# Patient Record
Sex: FEMALE | Race: WHITE | NOT HISPANIC OR LATINO | Employment: FULL TIME | URBAN - METROPOLITAN AREA
[De-identification: names, ages, dates, MRNs, and addresses within clinical notes are randomized per-mention and may not be internally consistent; named-entity substitution may affect disease eponyms.]

---

## 2020-03-09 ENCOUNTER — RECORDS - HEALTHEAST (OUTPATIENT)
Dept: LAB | Facility: CLINIC | Age: 50
End: 2020-03-09

## 2020-03-09 LAB
CHOLEST SERPL-MCNC: 227 MG/DL
FASTING STATUS PATIENT QL REPORTED: ABNORMAL
HDLC SERPL-MCNC: 67 MG/DL
LDLC SERPL CALC-MCNC: 140 MG/DL
TRIGL SERPL-MCNC: 100 MG/DL

## 2020-03-10 LAB
HPV SOURCE: NORMAL
HUMAN PAPILLOMA VIRUS 16 DNA: NEGATIVE
HUMAN PAPILLOMA VIRUS 18 DNA: NEGATIVE
HUMAN PAPILLOMA VIRUS FINAL DIAGNOSIS: NORMAL
HUMAN PAPILLOMA VIRUS OTHER HR: NEGATIVE
SPECIMEN DESCRIPTION: NORMAL

## 2020-03-18 ENCOUNTER — RECORDS - HEALTHEAST (OUTPATIENT)
Dept: ADMINISTRATIVE | Facility: OTHER | Age: 50
End: 2020-03-18

## 2021-08-31 ENCOUNTER — LAB REQUISITION (OUTPATIENT)
Dept: LAB | Facility: CLINIC | Age: 51
End: 2021-08-31

## 2021-08-31 DIAGNOSIS — R19.7 DIARRHEA, UNSPECIFIED: ICD-10-CM

## 2021-08-31 LAB — C DIFF TOX B STL QL: NEGATIVE

## 2021-08-31 PROCEDURE — 87506 IADNA-DNA/RNA PROBE TQ 6-11: CPT | Performed by: FAMILY MEDICINE

## 2021-08-31 PROCEDURE — 87493 C DIFF AMPLIFIED PROBE: CPT | Performed by: FAMILY MEDICINE

## 2021-08-31 PROCEDURE — 87177 OVA AND PARASITES SMEARS: CPT | Performed by: FAMILY MEDICINE

## 2021-09-02 LAB — O+P STL MICRO: NEGATIVE

## 2023-10-30 ENCOUNTER — LAB REQUISITION (OUTPATIENT)
Dept: LAB | Facility: CLINIC | Age: 53
End: 2023-10-30
Payer: COMMERCIAL

## 2023-10-30 DIAGNOSIS — Z13.220 ENCOUNTER FOR SCREENING FOR LIPOID DISORDERS: ICD-10-CM

## 2023-10-30 PROCEDURE — 80061 LIPID PANEL: CPT | Mod: ORL | Performed by: FAMILY MEDICINE

## 2023-10-31 LAB
CHOLEST SERPL-MCNC: 230 MG/DL
HDLC SERPL-MCNC: 80 MG/DL
LDLC SERPL CALC-MCNC: 132 MG/DL
NONHDLC SERPL-MCNC: 150 MG/DL
TRIGL SERPL-MCNC: 92 MG/DL

## 2025-01-15 ENCOUNTER — LAB REQUISITION (OUTPATIENT)
Dept: LAB | Facility: CLINIC | Age: 55
End: 2025-01-15
Payer: COMMERCIAL

## 2025-01-15 DIAGNOSIS — Z00.00 ENCOUNTER FOR GENERAL ADULT MEDICAL EXAMINATION WITHOUT ABNORMAL FINDINGS: ICD-10-CM

## 2025-01-15 LAB
CHOLEST SERPL-MCNC: 282 MG/DL
FASTING STATUS PATIENT QL REPORTED: YES
HDLC SERPL-MCNC: 64 MG/DL
LDLC SERPL CALC-MCNC: 196 MG/DL
NONHDLC SERPL-MCNC: 218 MG/DL
TRIGL SERPL-MCNC: 111 MG/DL

## 2025-01-15 PROCEDURE — 80061 LIPID PANEL: CPT | Mod: ORL | Performed by: FAMILY MEDICINE

## 2025-01-15 PROCEDURE — 87624 HPV HI-RISK TYP POOLED RSLT: CPT | Mod: ORL | Performed by: FAMILY MEDICINE

## 2025-01-15 PROCEDURE — G0145 SCR C/V CYTO,THINLAYER,RESCR: HCPCS | Mod: ORL | Performed by: FAMILY MEDICINE

## 2025-01-16 LAB
HPV HR 12 DNA CVX QL NAA+PROBE: NEGATIVE
HPV16 DNA CVX QL NAA+PROBE: NEGATIVE
HPV18 DNA CVX QL NAA+PROBE: NEGATIVE
HUMAN PAPILLOMA VIRUS FINAL DIAGNOSIS: NORMAL

## 2025-01-20 LAB
BKR AP ASSOCIATED HPV REPORT: NORMAL
BKR LAB AP GYN ADEQUACY: NORMAL
BKR LAB AP GYN INTERPRETATION: NORMAL
BKR LAB AP LMP: NORMAL
BKR LAB AP PREVIOUS ABNL DX: NORMAL
BKR LAB AP PREVIOUS ABNORMAL: NORMAL
PATH REPORT.COMMENTS IMP SPEC: NORMAL
PATH REPORT.COMMENTS IMP SPEC: NORMAL
PATH REPORT.RELEVANT HX SPEC: NORMAL

## 2025-03-29 ENCOUNTER — APPOINTMENT (OUTPATIENT)
Dept: GENERAL RADIOLOGY | Facility: CLINIC | Age: 55
End: 2025-03-29
Attending: EMERGENCY MEDICINE
Payer: COMMERCIAL

## 2025-03-29 ENCOUNTER — HOSPITAL ENCOUNTER (EMERGENCY)
Facility: CLINIC | Age: 55
Discharge: HOME | End: 2025-03-29
Attending: EMERGENCY MEDICINE | Admitting: EMERGENCY MEDICINE
Payer: COMMERCIAL

## 2025-03-29 VITALS
HEART RATE: 62 BPM | WEIGHT: 163.36 LBS | HEIGHT: 61 IN | SYSTOLIC BLOOD PRESSURE: 101 MMHG | RESPIRATION RATE: 18 BRPM | OXYGEN SATURATION: 97 % | BODY MASS INDEX: 30.84 KG/M2 | DIASTOLIC BLOOD PRESSURE: 71 MMHG | TEMPERATURE: 97.9 F

## 2025-03-29 DIAGNOSIS — R07.9 CHEST PAIN, UNSPECIFIED TYPE: ICD-10-CM

## 2025-03-29 LAB
ANION GAP SERPL CALCULATED.3IONS-SCNC: 12 MMOL/L (ref 7–15)
BASOPHILS # BLD AUTO: 0.1 10E3/UL (ref 0–0.2)
BASOPHILS NFR BLD AUTO: 1 %
BUN SERPL-MCNC: 13.9 MG/DL (ref 6–20)
CALCIUM SERPL-MCNC: 9 MG/DL (ref 8.8–10.4)
CHLORIDE SERPL-SCNC: 104 MMOL/L (ref 98–107)
CREAT SERPL-MCNC: 0.99 MG/DL (ref 0.51–0.95)
D DIMER PPP FEU-MCNC: <0.27 UG/ML FEU (ref 0–0.5)
EGFRCR SERPLBLD CKD-EPI 2021: 68 ML/MIN/1.73M2
EOSINOPHIL # BLD AUTO: 0.4 10E3/UL (ref 0–0.7)
EOSINOPHIL NFR BLD AUTO: 4 %
ERYTHROCYTE [DISTWIDTH] IN BLOOD BY AUTOMATED COUNT: 13.1 % (ref 10–15)
FLUAV RNA SPEC QL NAA+PROBE: NEGATIVE
FLUBV RNA RESP QL NAA+PROBE: NEGATIVE
GLUCOSE SERPL-MCNC: 100 MG/DL (ref 70–99)
HCO3 SERPL-SCNC: 25 MMOL/L (ref 22–29)
HCT VFR BLD AUTO: 37.8 % (ref 35–47)
HGB BLD-MCNC: 12.7 G/DL (ref 11.7–15.7)
HOLD SPECIMEN: NORMAL
HOLD SPECIMEN: NORMAL
IMM GRANULOCYTES # BLD: 0 10E3/UL
IMM GRANULOCYTES NFR BLD: 0 %
LYMPHOCYTES # BLD AUTO: 3.7 10E3/UL (ref 0.8–5.3)
LYMPHOCYTES NFR BLD AUTO: 44 %
MCH RBC QN AUTO: 29.7 PG (ref 26.5–33)
MCHC RBC AUTO-ENTMCNC: 33.6 G/DL (ref 31.5–36.5)
MCV RBC AUTO: 89 FL (ref 78–100)
MONOCYTES # BLD AUTO: 0.6 10E3/UL (ref 0–1.3)
MONOCYTES NFR BLD AUTO: 7 %
NEUTROPHILS # BLD AUTO: 3.7 10E3/UL (ref 1.6–8.3)
NEUTROPHILS NFR BLD AUTO: 44 %
NRBC # BLD AUTO: 0 10E3/UL
NRBC BLD AUTO-RTO: 0 /100
PLATELET # BLD AUTO: 232 10E3/UL (ref 150–450)
POTASSIUM SERPL-SCNC: 3.7 MMOL/L (ref 3.4–5.3)
RBC # BLD AUTO: 4.27 10E6/UL (ref 3.8–5.2)
RSV RNA SPEC NAA+PROBE: NEGATIVE
SARS-COV-2 RNA RESP QL NAA+PROBE: NEGATIVE
SODIUM SERPL-SCNC: 141 MMOL/L (ref 135–145)
TROPONIN T SERPL HS-MCNC: <6 NG/L
TROPONIN T SERPL HS-MCNC: <6 NG/L
WBC # BLD AUTO: 8.4 10E3/UL (ref 4–11)

## 2025-03-29 PROCEDURE — 99285 EMERGENCY DEPT VISIT HI MDM: CPT | Mod: 25

## 2025-03-29 PROCEDURE — 93005 ELECTROCARDIOGRAM TRACING: CPT

## 2025-03-29 PROCEDURE — 85004 AUTOMATED DIFF WBC COUNT: CPT | Performed by: EMERGENCY MEDICINE

## 2025-03-29 PROCEDURE — 36415 COLL VENOUS BLD VENIPUNCTURE: CPT | Performed by: EMERGENCY MEDICINE

## 2025-03-29 PROCEDURE — 85014 HEMATOCRIT: CPT | Performed by: EMERGENCY MEDICINE

## 2025-03-29 PROCEDURE — 85379 FIBRIN DEGRADATION QUANT: CPT | Performed by: EMERGENCY MEDICINE

## 2025-03-29 PROCEDURE — 71046 X-RAY EXAM CHEST 2 VIEWS: CPT

## 2025-03-29 PROCEDURE — 84484 ASSAY OF TROPONIN QUANT: CPT | Performed by: EMERGENCY MEDICINE

## 2025-03-29 PROCEDURE — 87637 SARSCOV2&INF A&B&RSV AMP PRB: CPT | Performed by: EMERGENCY MEDICINE

## 2025-03-29 PROCEDURE — 80048 BASIC METABOLIC PNL TOTAL CA: CPT | Performed by: EMERGENCY MEDICINE

## 2025-03-29 ASSESSMENT — COLUMBIA-SUICIDE SEVERITY RATING SCALE - C-SSRS
2. HAVE YOU ACTUALLY HAD ANY THOUGHTS OF KILLING YOURSELF IN THE PAST MONTH?: NO
6. HAVE YOU EVER DONE ANYTHING, STARTED TO DO ANYTHING, OR PREPARED TO DO ANYTHING TO END YOUR LIFE?: NO
1. IN THE PAST MONTH, HAVE YOU WISHED YOU WERE DEAD OR WISHED YOU COULD GO TO SLEEP AND NOT WAKE UP?: NO

## 2025-03-29 ASSESSMENT — ACTIVITIES OF DAILY LIVING (ADL)
ADLS_ACUITY_SCORE: 41
ADLS_ACUITY_SCORE: 41

## 2025-03-29 NOTE — ED PROVIDER NOTES
"  Emergency Department Note      History of Present Illness     Chief Complaint   Chest Pain      HPI   Pari Cruz is a 54 year old female presenting with chest pain.  Patient reports coming home from [place] earlier this evening, reports nearly 9 hours driving over 2 days.  Around 0105 patient awoke from sleep with midsternal burning/squeezing chest pain. She reports pain radiated up to throat.  She reports accompanying chills/sweating with this episode and nausea.  She also noted a \"headrush\" sensation. She reports taking pepcid and noted significant improvement though called EMS who gave 4 baby ASA and cleared patient after rhythm strip to self transport.  Patient denies any active chest pain at bedside. No fever, headache, dyspnea, abdominal pain, vomiting or other symptoms. No sick contacts. No ETOH.    Independent Historian   None    Review of External Notes   1/15/25 office visit for hyperlipidemia    Past Medical History     Medical History and Problem List   Past Medical History:   Diagnosis Date    Benign neoplasm of colon age 21    Pure hypercholesterolemia        Medications   ibuprofen (ADVIL,MOTRIN) 200 MG tablet  ORDER FOR DME        Surgical History   Past Surgical History:   Procedure Laterality Date    HC REMOVAL OF TONSILS,12+ Y/O  age 19    Tonsils 12+y.o.       Physical Exam     Patient Vitals for the past 24 hrs:   BP Temp Temp src Pulse Resp SpO2 Height Weight   03/29/25 0208 114/83 97.9  F (36.6  C) Oral 70 18 100 % 1.549 m (5' 1\") 74.1 kg (163 lb 5.8 oz)     Physical Exam  Nursing note and vitals reviewed.  Constitutional: Well nourished.   Eyes: Conjunctiva normal.  Pupils are equal, round, and reactive to light.   ENT: Nose normal. Mucous membranes pink and moist.    Neck: Normal range of motion.  CVS: Normal rate, regular rhythm.  Normal heart sounds.  No murmur.  Pulmonary: Lungs clear to auscultation bilaterally. No wheezes/rales/rhonchi.  GI: Abdomen soft. Nontender, " nondistended. No rigidity or guarding.    MSK: No calf tenderness or swelling.  Neuro: Alert. Follows simple commands.  Skin: Skin is warm and dry. No rash noted.   Psychiatric: Normal affect.       Diagnostics     Lab Results   Labs Ordered and Resulted from Time of ED Arrival to Time of ED Departure   BASIC METABOLIC PANEL - Abnormal       Result Value    Sodium 141      Potassium 3.7      Chloride 104      Carbon Dioxide (CO2) 25      Anion Gap 12      Urea Nitrogen 13.9      Creatinine 0.99 (*)     GFR Estimate 68      Calcium 9.0      Glucose 100 (*)    TROPONIN T, HIGH SENSITIVITY - Normal    Troponin T, High Sensitivity <6     D DIMER QUANTITATIVE - Normal    D-Dimer Quantitative <0.27     INFLUENZA A/B, RSV AND SARS-COV2 PCR - Normal    Influenza A PCR Negative      Influenza B PCR Negative      RSV PCR Negative      SARS CoV2 PCR Negative     CBC WITH PLATELETS AND DIFFERENTIAL    WBC Count 8.4      RBC Count 4.27      Hemoglobin 12.7      Hematocrit 37.8      MCV 89      MCH 29.7      MCHC 33.6      RDW 13.1      Platelet Count 232      % Neutrophils 44      % Lymphocytes 44      % Monocytes 7      % Eosinophils 4      % Basophils 1      % Immature Granulocytes 0      NRBCs per 100 WBC 0      Absolute Neutrophils 3.7      Absolute Lymphocytes 3.7      Absolute Monocytes 0.6      Absolute Eosinophils 0.4      Absolute Basophils 0.1      Absolute Immature Granulocytes 0.0      Absolute NRBCs 0.0     TROPONIN T, HIGH SENSITIVITY       Imaging   XR Chest 2 Views   Final Result   IMPRESSION: Negative chest.          EKG   ECG results from 03/29/25   EKG 12 lead     Value    Systolic Blood Pressure     Diastolic Blood Pressure     Ventricular Rate 64    Atrial Rate 64    NV Interval 158    QRS Duration 82        QTc 420    P Axis 58    R AXIS 23    T Axis -2    Interpretation ECG      Sinus rhythm  Low voltage QRS  ST & T wave abnormality, consider inferior ischemia  Abnormal ECG  No previous ECGs  available     EKG 12 lead     Value    Systolic Blood Pressure     Diastolic Blood Pressure     Ventricular Rate 63    Atrial Rate 63    ME Interval 158    QRS Duration 82        QTc 431    P Axis 47    R AXIS 16    T Axis 41    Interpretation ECG      Sinus rhythm  Low voltage QRS  Nonspecific T wave abnormality  Abnormal ECG  When compared with ECG of 29-Mar-2025 02:13, (unconfirmed)  No significant change was found           Independent Interpretation   CXR: No pneumothorax, infiltrate, or pleural effusion.    ED Course      Medications Administered   Medications - No data to display    Procedures   Procedures     Discussion of Management   None    ED Course        Additional Documentation  None    Medical Decision Making / Diagnosis     CMS Diagnoses: None  HEART Score:    Predicts Major Adverse Cardiac Events within 6 weeks:    History:   Highly suspicious:  2   Moderately suspicious: 1   Slightly/Non-suspicious: 0  ECG:   Significant ST depression 2   Nonspecific repolarization 1   Normal    0  Age:    >=65    2   >45-<65   1   <= 45    0  Risk Factors [DM, Current or recent smoker, HTN, HLP, FMH CAD, Obesity]   >=3 or Hx. CAD  2   1-2    1   None    0  Troponin:   >= 3x normal   2   >1 to <3x normal  1   Normal    0    This Patient's Score:   3    Interpretation:    0-3:    2.5% risk of MACE (may consider D/C)   4-6:    20.3% (Observation)   7-10:    72.7% (Admit, consider early invasive strategy)      MIPS       None    MDM   Pari Cruz is a 54 year old female presenting with chest pain though asymptomatic on arrival.  Screening EKG without STEMI.  High-sensitivity screening troponin negative x 2.  Patient remained chest pain-free throughout her time in the ED.  She has no abdominal tenderness and I doubt intra-abdominal catastrophe.  D-dimer negative, clinically doubt PE.  Chest x-ray without focal pneumonia, fluid overload, widened mediastinum or pneumothorax.  No profound anemia or other  significant electrolyte derangements on lab work.  Strong suspicion presentation was more secondary to dyspepsia.  I do not feel further advanced workup is warranted at this point in time.  Patient has Pepcid at home and I did discuss not unreasonable to take for the next week to monitor for increasing chest pain, dyspnea, abdominal pain or should symptoms worsen or change promptly represent.  Plan to close outpatient follow-up as additional outpatient testing will be needed should symptoms persist.    Disposition   The patient was discharged.     Diagnosis     ICD-10-CM    1. Chest pain, unspecified type  R07.9            Discharge Medications   New Prescriptions    No medications on file         DO Zeyad Still Lindsey E, DO  03/29/25 7155

## 2025-03-29 NOTE — ED TRIAGE NOTES
"Midsternal CP, cold chills woke pt up at 0116.  Called EMS - did EKG and was \"normal enough to drive here instead\". Chewed 4 baby ASA for EMS prior to arrival.        "

## 2025-03-31 LAB
ATRIAL RATE - MUSE: 63 BPM
ATRIAL RATE - MUSE: 64 BPM
DIASTOLIC BLOOD PRESSURE - MUSE: NORMAL MMHG
DIASTOLIC BLOOD PRESSURE - MUSE: NORMAL MMHG
INTERPRETATION ECG - MUSE: NORMAL
INTERPRETATION ECG - MUSE: NORMAL
P AXIS - MUSE: 47 DEGREES
P AXIS - MUSE: 58 DEGREES
PR INTERVAL - MUSE: 158 MS
PR INTERVAL - MUSE: 158 MS
QRS DURATION - MUSE: 82 MS
QRS DURATION - MUSE: 82 MS
QT - MUSE: 408 MS
QT - MUSE: 422 MS
QTC - MUSE: 420 MS
QTC - MUSE: 431 MS
R AXIS - MUSE: 16 DEGREES
R AXIS - MUSE: 23 DEGREES
SYSTOLIC BLOOD PRESSURE - MUSE: NORMAL MMHG
SYSTOLIC BLOOD PRESSURE - MUSE: NORMAL MMHG
T AXIS - MUSE: -2 DEGREES
T AXIS - MUSE: 41 DEGREES
VENTRICULAR RATE- MUSE: 63 BPM
VENTRICULAR RATE- MUSE: 64 BPM

## 2025-04-23 ENCOUNTER — LAB REQUISITION (OUTPATIENT)
Dept: LAB | Facility: CLINIC | Age: 55
End: 2025-04-23
Payer: COMMERCIAL

## 2025-04-23 DIAGNOSIS — M79.609 PAIN IN UNSPECIFIED LIMB: ICD-10-CM

## 2025-04-23 LAB — FOLATE SERPL-MCNC: 7.7 NG/ML (ref 4.6–34.8)

## 2025-04-23 PROCEDURE — 82746 ASSAY OF FOLIC ACID SERUM: CPT | Mod: ORL | Performed by: FAMILY MEDICINE

## 2025-04-23 PROCEDURE — 86140 C-REACTIVE PROTEIN: CPT | Mod: ORL | Performed by: FAMILY MEDICINE

## 2025-04-23 PROCEDURE — 83695 ASSAY OF LIPOPROTEIN(A): CPT | Mod: ORL | Performed by: FAMILY MEDICINE

## 2025-04-23 PROCEDURE — 84443 ASSAY THYROID STIM HORMONE: CPT | Mod: ORL | Performed by: FAMILY MEDICINE

## 2025-04-23 PROCEDURE — 82607 VITAMIN B-12: CPT | Mod: ORL | Performed by: FAMILY MEDICINE

## 2025-04-23 PROCEDURE — 80061 LIPID PANEL: CPT | Mod: ORL | Performed by: FAMILY MEDICINE

## 2025-04-23 PROCEDURE — 80053 COMPREHEN METABOLIC PANEL: CPT | Mod: ORL | Performed by: FAMILY MEDICINE

## 2025-04-24 LAB
ALBUMIN SERPL BCG-MCNC: 4.5 G/DL (ref 3.5–5.2)
ALP SERPL-CCNC: 57 U/L (ref 40–150)
ALT SERPL W P-5'-P-CCNC: 18 U/L (ref 0–50)
ANION GAP SERPL CALCULATED.3IONS-SCNC: 10 MMOL/L (ref 7–15)
APO A-I SERPL-MCNC: 6 MG/DL
AST SERPL W P-5'-P-CCNC: 23 U/L (ref 0–45)
BILIRUB SERPL-MCNC: 0.3 MG/DL
BUN SERPL-MCNC: 20 MG/DL (ref 6–20)
CALCIUM SERPL-MCNC: 9 MG/DL (ref 8.8–10.4)
CHLORIDE SERPL-SCNC: 103 MMOL/L (ref 98–107)
CHOLEST SERPL-MCNC: 263 MG/DL
CREAT SERPL-MCNC: 0.87 MG/DL (ref 0.51–0.95)
CRP SERPL-MCNC: <3 MG/L
EGFRCR SERPLBLD CKD-EPI 2021: 79 ML/MIN/1.73M2
FASTING STATUS PATIENT QL REPORTED: ABNORMAL
FASTING STATUS PATIENT QL REPORTED: NORMAL
GLUCOSE SERPL-MCNC: 91 MG/DL (ref 70–99)
HCO3 SERPL-SCNC: 28 MMOL/L (ref 22–29)
HDLC SERPL-MCNC: 62 MG/DL
LDLC SERPL CALC-MCNC: 174 MG/DL
NONHDLC SERPL-MCNC: 201 MG/DL
POTASSIUM SERPL-SCNC: 4 MMOL/L (ref 3.4–5.3)
PROT SERPL-MCNC: 6.9 G/DL (ref 6.4–8.3)
SODIUM SERPL-SCNC: 141 MMOL/L (ref 135–145)
TRIGL SERPL-MCNC: 135 MG/DL
TSH SERPL DL<=0.005 MIU/L-ACNC: 1.13 UIU/ML (ref 0.3–4.2)
VIT B12 SERPL-MCNC: 434 PG/ML (ref 232–1245)

## 2025-06-03 ENCOUNTER — VIRTUAL VISIT (OUTPATIENT)
Dept: PHARMACY | Facility: PHYSICIAN GROUP | Age: 55
End: 2025-06-03
Payer: COMMERCIAL

## 2025-06-03 VITALS
WEIGHT: 161 LBS | DIASTOLIC BLOOD PRESSURE: 62 MMHG | HEART RATE: 60 BPM | HEIGHT: 62 IN | SYSTOLIC BLOOD PRESSURE: 94 MMHG | BODY MASS INDEX: 29.63 KG/M2

## 2025-06-03 DIAGNOSIS — E66.09 OBESITY DUE TO EXCESS CALORIES, UNSPECIFIED OBESITY SEVERITY: ICD-10-CM

## 2025-06-03 DIAGNOSIS — E78.2 MIXED HYPERLIPIDEMIA: Primary | ICD-10-CM

## 2025-06-03 PROCEDURE — 3074F SYST BP LT 130 MM HG: CPT | Mod: 93 | Performed by: PHARMACIST

## 2025-06-03 PROCEDURE — 3078F DIAST BP <80 MM HG: CPT | Mod: 93 | Performed by: PHARMACIST

## 2025-06-03 PROCEDURE — 99207 PR NO CHARGE LOS: CPT | Mod: 93 | Performed by: PHARMACIST

## 2025-06-03 RX ORDER — ROSUVASTATIN CALCIUM 5 MG/1
5 TABLET, COATED ORAL DAILY
COMMUNITY

## 2025-06-03 NOTE — PROGRESS NOTES
Medication Therapy Management (MTM) Encounter    ASSESSMENT:                            Medication Adherence/Access: See below for considerations.    Hyperlipidemia   Patient would benefit from initiation of statin due to history of elevated cholesterol levels despite periods of healthy diet and regular exercise. Discussed that if cholesterol is elevated due to genetics, then it doesn't seem likely that weight loss medication will help lower cholesterol indirectly.    Weight Management   She would benefit from continuing GLP-1/GLP-1+GIP RA. The direct from  program is affordable, so will have her start on more potent Zepbound    PLAN:                            1. Medication:     - Start Zepbound 2.5 mg injection once weekly. Rx sent to [facility] pharmacy.     - Start the rosuvastatin 5mg daily as discussed.    Follow-up: Tuesday, July 1st at 11:30 AM over the phone    SUBJECTIVE/OBJECTIVE:                          Pari Cruz is a 55 year old female seen for an initial visit. She was referred to me from Suad Thomson.      Reason for visit: Medication Review, weight management.    Allergies/ADRs: None  Past Medical History: Reviewed in chart  Tobacco: She reports that she has never smoked. She has never used smokeless tobacco.  Alcohol: 1-3 beverages / week    Medication Adherence/Access: no issues reported.    Hyperlipidemia   Rosuvastatin 5mg daily - has on hand, but has not started yet  The 10-year ASCVD risk score (Karthik DK, et al., 2019) is: 1.1%    Values used to calculate the score:      Age: 55 years      Sex: Female      Is Non- : No      Diabetic: No      Tobacco smoker: No      Systolic Blood Pressure: 94 mmHg      Is BP treated: No      HDL Cholesterol: 62 mg/dL      Total Cholesterol: 263 mg/dL  Recent Labs   Lab Test 04/23/25  1554 01/15/25  0800   CHOL 263* 282*   HDL 62 64   * 196*   TRIG 135 111      Weight Management   Previously on compounded  "semaglutide through [employer] clinical program  Started late December, increased from 0.25mg to 1mg by end of March  Lost 15 pounds over three months  Discontinued for 6 weeks due to back issue requiring prednisone  Restarted at 0.25mg on May 4th  Last dose taken on Sunday  Reports minimal appetite suppression after restart compared to initial experience    Weight Management Goals:  Desires to lose 25 pounds to reach maintenance weight  Plans to eventually reduce/discontinue medication use  Aims to rely on lifestyle modifications    Nutrition/Eating Habits: [employer] started December 2024.    Exercise/Activity: Has been limited recently due to a back injury.   Medications Tried/Failed:  None.   Medical History:  MEN2/Medullary Thyroid Cancer: none  Pancreatitis: none  Baseline GI symptomatology: none     Initial Consult Weight: 161lbs (4/23/25)   Current weight today: 161 lbs 0 oz  Cumulative Weight Loss: --- lb, ---% from baseline    Wt Readings from Last 4 Encounters:   04/23/25 161 lb (73 kg)   03/29/25 163 lb 5.8 oz (74.1 kg)   11/30/22 168 lb 6.4 oz (76.4 kg)   01/19/05 134 lb 3.2 oz (60.9 kg)     Estimated body mass index is 29.93 kg/m  as calculated from the following:    Height as of this encounter: 5' 1.5\" (1.562 m).    Weight as of this encounter: 161 lb (73 kg).      Today's Vitals: BP 94/62   Pulse 60   Ht 5' 1.5\" (1.562 m)   Wt 161 lb (73 kg)   LMP 12/25/2004   BMI 29.93 kg/m    ----------------  Post Discharge Medication Reconciliation Status: discharge medications reconciled, continue medications without change.    I spent 40 minutes with this patient today. All changes were made via collaborative practice agreement with Suad Thomson MD.     A summary of these recommendations was sent via clinic portal.    Zoie Griffin, Pharm.D., [facility]  Medication Therapy Management Pharmacist  [phone]     Telemedicine Visit Details  The patient's medications can be safely assessed " via a telemedicine encounter.  Type of service:  Telephone visit  Originating Location (pt. Location): Home    Distant Location (provider location):  On-site  Start Time: 10:01 AM  End Time: 10:41 AM     Medication Therapy Recommendations  Obesity due to excess calories, unspecified obesity severity   1 Current Medication: tirzepatide-weight management (ZEPBOUND) 2.5 MG/0.5ML vial   Current Medication Sig: Inject 2.5 mg subcutaneously once a week.   Rationale: Untreated condition - Needs additional medication therapy - Indication   Recommendation: Start Medication   Status: Accepted per CPA   Identified Date: 6/3/2025 Completed Date: 6/3/2025

## 2025-06-03 NOTE — PATIENT INSTRUCTIONS
Recommendations from today's MTM visit:                                                      ASSESSMENT:                            Medication Adherence/Access: See below for considerations.    Hyperlipidemia   Patient would benefit from initiation of statin due to history of elevated cholesterol levels despite periods of healthy diet and regular exercise. Discussed that if cholesterol is elevated due to genetics, then it doesn't seem likely that weight loss medication will help lower cholesterol indirectly.    Weight Management   She would benefit from continuing GLP-1/GLP-1+GIP RA. The direct from  program is affordable, so will have her start on more potent Zepbound    PLAN:                            1. Medication:     - Start Zepbound 2.5 mg injection once weekly. Rx sent to [facility] pharmacy.     - Start the rosuvastatin 5mg daily as discussed.    Follow-up: Tuesday, July 1st at 11:30 AM over the phone    It was great to speak with you today.  I value your experience and would be very thankful for your time with providing feedback on our clinic survey. You may receive a survey via email or text message in the next few days.     To schedule another MTM appointment, please call the clinic directly or you may call the scheduling line at [phone].    My Clinical Pharmacist's contact information:                                                      Please contact the clinic with any questions or concerns you have.      Zoie Griffin, Pharm.D., BCACP  Medication Therapy Management Pharmacist

## 2025-06-25 NOTE — PROGRESS NOTES
Medication Therapy Management (MTM) Encounter    ASSESSMENT:                            Medication Adherence/Access: See below for considerations.    Weight Management   She would benefit from continuing/titrating Zepbound for further weight loss.    PLAN:                            1. Medication:     - Increase Zepbound to 5mg injcted once weekly. Updated Rx sent to [facility] pharmacy.    Follow-up: Wednesday, July 23 at 12:30 PM    SUBJECTIVE/OBJECTIVE:                          Pari Cruz is a 55 year old female seen for a follow-up visit.      Reason for visit: Medication Review, weight management.    Allergies/ADRs: None  Past Medical History: Reviewed in chart  Tobacco: She reports that she has never smoked. She has never used smokeless tobacco.  Alcohol: 1-3 beverages / week    Medication Adherence/Access: no issues reported.    Weight Management   Zepbound 2.5mg injected once weekly - from [facility] - has taken 4 doses so far  No side effects noted  Initially noted appetite suppression, but this wore off after 3-4 days    Medication History:  Previously on compounded semaglutide through [facility] [employer] clinical program  Started late December, increased from 0.25mg to 1mg by end of March  Lost 15 pounds over three months    Weight Management Goals:  Desires to lose 25 pounds to reach maintenance weight  Plans to eventually reduce/discontinue medication use  Aims to rely on lifestyle modifications    Nutrition/Eating Habits: [employer] started December 2024.    Exercise/Activity: Has been limited recently due to a back injury.   Medications Tried/Failed:  None.   Medical History:  MEN2/Medullary Thyroid Cancer: none  Pancreatitis: none  Baseline GI symptomatology: none     Initial Consult Weight: 161lbs (4/23/25)   Current weight today: 0 lbs 0 oz  -2 pounds over the last month per home scale (weighs weekly)  Cumulative Weight Loss: --- lb, ---% from baseline    Wt Readings from Last 4  "Encounters:   04/23/25 161 lb (73 kg)   03/29/25 163 lb 5.8 oz (74.1 kg)   11/30/22 168 lb 6.4 oz (76.4 kg)   01/19/05 134 lb 3.2 oz (60.9 kg)     Estimated body mass index is 29.93 kg/m  as calculated from the following:    Height as of 4/23/25: 5' 1.5\" (1.562 m).    Weight as of 4/23/25: 161 lb (73 kg).      Today's Vitals: LMP 12/25/2004   ----------------  Post Discharge Medication Reconciliation Status: discharge medications reconciled, continue medications without change.    I spent 10 minutes with this patient today. All changes were made via collaborative practice agreement with Suad Thomson MD.     A summary of these recommendations was sent via clinic portal.    Zoie Griffin, Pharm.D., [facility]  Medication Therapy Management Pharmacist  [phone]     Telemedicine Visit Details  The patient's medications can be safely assessed via a telemedicine encounter.  Type of service:  Telephone visit  Originating Location (pt. Location): Home    Distant Location (provider location):  On-site  Start Time: 11:34 AM   End Time: 11:44 AM      Medication Therapy Recommendations  Obesity due to excess calories, unspecified obesity severity   1 Current Medication: tirzepatide-weight management (ZEPBOUND) 2.5 MG/0.5ML vial (Discontinued)   Current Medication Sig: Inject 2.5 mg subcutaneously once a week.   Rationale: Dose too low - Dosage too low - Effectiveness   Recommendation: Increase Dose - tirzepatide-weight management 5 MG/0.5ML vial   Status: Accepted per CPA   Identified Date: 7/1/2025 Completed Date: 7/1/2025              "

## 2025-07-01 ENCOUNTER — VIRTUAL VISIT (OUTPATIENT)
Dept: PHARMACY | Facility: PHYSICIAN GROUP | Age: 55
End: 2025-07-01
Payer: COMMERCIAL

## 2025-07-01 DIAGNOSIS — E66.09 OBESITY DUE TO EXCESS CALORIES, UNSPECIFIED OBESITY SEVERITY: Primary | ICD-10-CM

## 2025-07-01 PROCEDURE — 99207 PR NO CHARGE LOS: CPT | Mod: 93 | Performed by: PHARMACIST

## 2025-07-01 NOTE — PATIENT INSTRUCTIONS
Recommendations from today's MTM visit:                                                      1. Medication:     - Increase Zepbound to 5mg injcted once weekly. Updated Rx sent to [facility] pharmacy.    Follow-up: Wednesday, July 23 at 12:30 PM    It was great to speak with you today.  I value your experience and would be very thankful for your time with providing feedback on our clinic survey. You may receive a survey via email or text message in the next few days.     To schedule another MTM appointment, please call the clinic directly or you may call the scheduling line at [phone].    My Clinical Pharmacist's contact information:                                                      Please contact the clinic with any questions or concerns you have.      Zoie Griffin, Pharm.D., BCACP  Medication Therapy Management Pharmacist

## 2025-07-22 NOTE — PROGRESS NOTES
Medication Therapy Management (MTM) Encounter    ASSESSMENT:                            Medication Adherence/Access: See below for considerations.    Weight Management   She would benefit from continuing Zepbound at further dose given minimal side effects and ongoing weight loss    PLAN:                            1. Continue Zepbound to 5mg injcted once weekly. Updated Rx sent to [facility] pharmacy.    Follow-up: Wednesday, September 3rd at 1:00 PM    SUBJECTIVE/OBJECTIVE:                          Pari Cruz is a 55 year old female seen for a follow-up visit.      Reason for visit: Medication Review, weight management.    Allergies/ADRs: None  Past Medical History: Reviewed in chart  Tobacco: She reports that she has never smoked. She has never used smokeless tobacco.  Alcohol: 1-3 beverages / week    Medication Adherence/Access: no issues reported.    Weight Management   Zepbound 5mg injected once weekly - from [facility] - increased last visit, has taken 4 doses so far    Patient reports good progress since increasing the dose to 5 mg  Improved appetite suppression and more significant weight loss  Experiencing mild background nausea, typically after eating, lasting 1-2 hours  Patient feels benefits outweigh negative aspects of nausea  On 3rd week of 5 mg dose    Medication History:  Previously on compounded semaglutide through [employer] clinical program  Started late December, increased from 0.25mg to 1mg by end of March  Lost 15 pounds over three months    Weight Management Goals:  Desires to lose 25 pounds to reach maintenance weight  Plans to eventually reduce/discontinue medication use  Aims to rely on lifestyle modifications    Nutrition/Eating Habits: [employer] started December 2024.    Exercise/Activity: Has been limited recently due to a back injury.   Medications Tried/Failed:  None.   Medical History:  MEN2/Medullary Thyroid Cancer: none  Pancreatitis: none  Baseline GI  "symptomatology: none     Initial Consult Weight: 161lbs (4/23/25)   Current weight today: 0 lbs 0 oz  -3.5 pounds since increasing Zepbound to 5mg (weighs weekly)  Cumulative Weight Loss: --- lb, ---% from baseline    Wt Readings from Last 4 Encounters:   04/23/25 161 lb (73 kg)   03/29/25 163 lb 5.8 oz (74.1 kg)   11/30/22 168 lb 6.4 oz (76.4 kg)   01/19/05 134 lb 3.2 oz (60.9 kg)     Estimated body mass index is 29.93 kg/m  as calculated from the following:    Height as of 4/23/25: 5' 1.5\" (1.562 m).    Weight as of 4/23/25: 161 lb (73 kg).      Today's Vitals: LMP 12/25/2004   ----------------    I spent 9 minutes with this patient today. All changes were made via collaborative practice agreement with Suad Thomson MD.     A summary of these recommendations was sent via clinic portal.    Zoie Griffin, Pharm.D., BCACP  Medication Therapy Management Pharmacist  [phone]     Telemedicine Visit Details  The patient's medications can be safely assessed via a telemedicine encounter.  Type of service:  Telephone visit  Originating Location (pt. Location): Home    Distant Location (provider location):  On-site  Start Time: 12:34 PM   End Time: 12:43 PM       Medication Therapy Recommendations  No medication therapy recommendations to display       "

## 2025-07-23 ENCOUNTER — VIRTUAL VISIT (OUTPATIENT)
Dept: PHARMACY | Facility: PHYSICIAN GROUP | Age: 55
End: 2025-07-23
Payer: COMMERCIAL

## 2025-07-23 DIAGNOSIS — E66.09 OBESITY DUE TO EXCESS CALORIES, UNSPECIFIED OBESITY SEVERITY: Primary | ICD-10-CM

## 2025-07-23 PROCEDURE — 99207 PR NO CHARGE LOS: CPT | Mod: 93 | Performed by: PHARMACIST

## 2025-07-23 NOTE — PATIENT INSTRUCTIONS
Recommendations from today's MTM visit:                                                      ASSESSMENT:                            Medication Adherence/Access: See below for considerations.    Weight Management   She would benefit from continuing Zepbound at further dose given minimal side effects and ongoing weight loss    PLAN:                            1. Continue Zepbound to 5mg injcted once weekly. Updated Rx sent to [facility] pharmacy.    Follow-up: Wednesday, September 3rd at 1:00 PM    It was great to speak with you today.  I value your experience and would be very thankful for your time with providing feedback on our clinic survey. You may receive a survey via email or text message in the next few days.     To schedule another MTM appointment, please call the clinic directly or you may call the scheduling line at [phone].    My Clinical Pharmacist's contact information:                                                      Please contact the clinic with any questions or concerns you have.      Zoie Griffin, Pharm.D., BCACP  Medication Therapy Management Pharmacist

## 2025-09-03 ENCOUNTER — VIRTUAL VISIT (OUTPATIENT)
Dept: PHARMACY | Facility: PHYSICIAN GROUP | Age: 55
End: 2025-09-03
Payer: COMMERCIAL

## 2025-09-03 DIAGNOSIS — E66.09 OBESITY DUE TO EXCESS CALORIES, UNSPECIFIED OBESITY SEVERITY: Primary | ICD-10-CM

## 2025-09-03 PROCEDURE — 99207 PR NO CHARGE LOS: CPT | Mod: 93 | Performed by: PHARMACIST
